# Patient Record
Sex: FEMALE | Race: OTHER | NOT HISPANIC OR LATINO
[De-identification: names, ages, dates, MRNs, and addresses within clinical notes are randomized per-mention and may not be internally consistent; named-entity substitution may affect disease eponyms.]

---

## 2020-10-06 ENCOUNTER — APPOINTMENT (OUTPATIENT)
Dept: SURGICAL ONCOLOGY | Facility: CLINIC | Age: 40
End: 2020-10-06
Payer: COMMERCIAL

## 2020-10-06 ENCOUNTER — RESULT REVIEW (OUTPATIENT)
Age: 40
End: 2020-10-06

## 2020-10-06 PROBLEM — Z00.00 ENCOUNTER FOR PREVENTIVE HEALTH EXAMINATION: Status: ACTIVE | Noted: 2020-10-06

## 2020-10-06 PROCEDURE — 99205 OFFICE O/P NEW HI 60 MIN: CPT | Mod: 25

## 2020-10-06 PROCEDURE — 19083 BX BREAST 1ST LESION US IMAG: CPT | Mod: RT

## 2020-10-06 PROCEDURE — 19084Z: CUSTOM | Mod: RT

## 2021-11-26 ENCOUNTER — TRANSCRIPTION ENCOUNTER (OUTPATIENT)
Age: 41
End: 2021-11-26

## 2023-05-31 ENCOUNTER — APPOINTMENT (OUTPATIENT)
Dept: PHYSICAL MEDICINE AND REHAB | Facility: CLINIC | Age: 43
End: 2023-05-31

## 2023-05-31 ENCOUNTER — APPOINTMENT (OUTPATIENT)
Dept: ORTHOPEDIC SURGERY | Facility: CLINIC | Age: 43
End: 2023-05-31

## 2023-06-01 ENCOUNTER — APPOINTMENT (OUTPATIENT)
Dept: ORTHOPEDIC SURGERY | Facility: CLINIC | Age: 43
End: 2023-06-01
Payer: COMMERCIAL

## 2023-06-01 ENCOUNTER — NON-APPOINTMENT (OUTPATIENT)
Age: 43
End: 2023-06-01

## 2023-06-01 VITALS — BODY MASS INDEX: 22.15 KG/M2 | HEIGHT: 63 IN | WEIGHT: 125 LBS

## 2023-06-01 PROCEDURE — 72110 X-RAY EXAM L-2 SPINE 4/>VWS: CPT

## 2023-06-01 PROCEDURE — 99204 OFFICE O/P NEW MOD 45 MIN: CPT

## 2023-06-01 RX ORDER — GABAPENTIN 300 MG/1
300 CAPSULE ORAL 3 TIMES DAILY
Qty: 90 | Refills: 0 | Status: ACTIVE | COMMUNITY
Start: 2023-06-01 | End: 1900-01-01

## 2023-06-01 RX ORDER — DICLOFENAC SODIUM 75 MG/1
75 TABLET, DELAYED RELEASE ORAL
Qty: 60 | Refills: 1 | Status: ACTIVE | COMMUNITY
Start: 2023-06-01 | End: 1900-01-01

## 2023-06-01 RX ORDER — METHYLPREDNISOLONE 4 MG/1
4 TABLET ORAL
Qty: 1 | Refills: 0 | Status: ACTIVE | COMMUNITY
Start: 2023-06-01 | End: 1900-01-01

## 2023-06-01 NOTE — ASSESSMENT
[FreeTextEntry1] : 43 year old female presents with severe low back pain radiating to her left lower extremity. She reports paresthesias. She is weaker in the left leg.  The symptoms started May 9, 2023 without injury.  She has a left foot drop.  She is otherwise neurologically intact. She will be sent for a lumbar MRI.  She was given a steroid dose pack, gabapentin and diclofenac.  The patient was given a referral for physical therapy. She will followup after her MRI. We discussed red flag symptoms that would require emergent evaluation. She knows to call with any questions or concerns or if her symptoms acutely worsen.

## 2023-06-01 NOTE — PHYSICAL EXAM
[de-identified] : General: No acute distress, conversant, well-nourished.\par Head: Normocephalic, atraumatic\par Neck: trachea midline, FROM\par Heart: normotensive and normal rate and rhythm\par Lungs: No labored breathing\par Skin: No abrasions, no rashes, no edema\par Psych: Alert and oriented to person, place and time\par Extremities: no peripheral edema or digital cyanosis\par Gait: Antalgic gait\par Vascular: warm and well perfused distally, palpable distal pulses\par \par MSK:\par Lumbar spine:\par No tenderness to palpation.  No step-off, no deformity.\par \par NEURO EXAM:\par Sensation \par Left L2  -  2/2            \par Left L3  -  2/2\par Left L4  -  2/2\par Left L5  -  1/2\par Left S1  -  1/2\par \par Right L2  -  2/2            \par Right L3  -  2/2\par Right L4  -  2/2\par Right L5  -  2/2\par Right S1  -  2/2\par \par Motor: \par Left L2 (hip flexion)                            5/5                \par Left L3 (knee extension)                   5/5                \par Left L4 (ankle dorsiflexion)                 3/5                \par Left L5 (long toe extensor)                3/5                \par Left S1 (ankle plantar flexion)           5/5\par \par Right L2 (hip flexion)                            5/5                \par Right L3 (knee extension)                   5/5                \par Right L4 (ankle dorsiflexion)                 5/5                \par Right L5 (long toe extensor)                5/5                \par Right S1 (ankle plantar flexion)           5/5\par \par Reflexes: Normal and symmetric\par Negative clonus.  Down-going Babinski.   [de-identified] : I ordered radiographs to evaluate the patient's symptoms.\par \par Lumbar 4 view radiographs taken in the office today show no dislocation or fracture.  Lumbar spondylosis.  No instability on dynamic series.

## 2023-06-01 NOTE — HISTORY OF PRESENT ILLNESS
[de-identified] : 43 year old female presents with severe low back pain radiating to her left lower extremity. She reports paresthesias. She is weaker in the left leg.  The symptoms started May 9, 2023 without injury. She denies recent illness, fevers, balance problems, saddle anesthesia, urinary retention or fecal incontinence.  She tried Advil without improvement.

## 2023-06-09 ENCOUNTER — APPOINTMENT (OUTPATIENT)
Dept: ORTHOPEDIC SURGERY | Facility: CLINIC | Age: 43
End: 2023-06-09
Payer: COMMERCIAL

## 2023-06-09 PROCEDURE — 99214 OFFICE O/P EST MOD 30 MIN: CPT

## 2023-06-18 NOTE — HISTORY OF PRESENT ILLNESS
[de-identified] : 43 year old female presents with followup with back pain radiating to her left lower extremity. She reports paresthesias.The symptoms started May 9, 2023 without injury. She denies recent illness, fevers, balance problems, saddle anesthesia, urinary retention or fecal incontinence.  Since her last visit the pain has been improving. She feels stronger.  She is here to review her MRI.

## 2023-06-18 NOTE — ASSESSMENT
[FreeTextEntry1] : 43 year old female presents with followup with back pain radiating to her left lower extremity. She reports paresthesias.The symptoms started May 9, 2023 without injury Since her last visit the pain has been improving. She feels stronger. She is otherwise neurologically intact. We reviewed her lumbar MRI. We discussed treatment options including physical therapy, medications, spinal injections and surgery.  She will continue PT.  She will continue diclofenac. She will followup in 4 weeks. We discussed red flag symptoms that would require emergent evaluation. She knows to call with any questions or concerns or if her symptoms acutely worsen.

## 2023-06-18 NOTE — PHYSICAL EXAM
[de-identified] : General: No acute distress, conversant, well-nourished.\par Head: Normocephalic, atraumatic\par Neck: trachea midline, FROM\par Heart: normotensive and normal rate and rhythm\par Lungs: No labored breathing\par Skin: No abrasions, no rashes, no edema\par Psych: Alert and oriented to person, place and time\par Extremities: no peripheral edema or digital cyanosis\par Gait: Antalgic gait\par Vascular: warm and well perfused distally, palpable distal pulses\par \par MSK:\par Lumbar spine:\par No tenderness to palpation.  No step-off, no deformity.\par \par NEURO EXAM:\par Sensation \par Left L2  -  2/2            \par Left L3  -  2/2\par Left L4  -  2/2\par Left L5  -  1/2\par Left S1  -  1/2\par \par Right L2  -  2/2            \par Right L3  -  2/2\par Right L4  -  2/2\par Right L5  -  2/2\par Right S1  -  2/2\par \par Motor: \par Left L2 (hip flexion)                            5/5                \par Left L3 (knee extension)                   5/5                \par Left L4 (ankle dorsiflexion)                 5/5                \par Left L5 (long toe extensor)                4/5                \par Left S1 (ankle plantar flexion)           5/5\par \par Right L2 (hip flexion)                            5/5                \par Right L3 (knee extension)                   5/5                \par Right L4 (ankle dorsiflexion)                 5/5                \par Right L5 (long toe extensor)                5/5                \par Right S1 (ankle plantar flexion)           5/5\par \par Reflexes: Normal and symmetric\par Negative clonus.  Down-going Babinski.   [de-identified] : Lumbar MRI (6/3/23) There is no spondylolisthesis or loss of vertebral body heights. No marrow signal abnormality is detected.\par \par L1-L2: There is no disc herniation, central canal stenosis, or foraminal narrowing.\par \par L2-L3: There is no disc herniation, central canal stenosis, or foraminal narrowing.\par \par L3-L4: A broad-based right paracentral disc herniation superimposed upon a disc bulge results in impingement of the traversing right L4 nerve root. Posterior element hypertrophy contributes to mild central canal stenosis. No foraminal narrowing is observed. \par \par L4-L5: A broad-based central/left paracentral disc herniation results in impingement of the traversing left L5 nerve root. No foraminal narrowing is observed. \par \par L5-S1: There is no disc herniation, central canal stenosis, or foraminal narrowing.\par \par The conus medullaris is normal in morphology, signal characteristics, and location.  No intradural abnormality is demonstrated. The visualized portions of the sacrum and sacroiliac joints are unremarkable.\par \par Lumbar 4 view radiographs taken in the office today show no dislocation or fracture.  Lumbar spondylosis.  No instability on dynamic series.

## 2023-07-20 ENCOUNTER — APPOINTMENT (OUTPATIENT)
Dept: ORTHOPEDIC SURGERY | Facility: CLINIC | Age: 43
End: 2023-07-20
Payer: COMMERCIAL

## 2023-07-20 DIAGNOSIS — M54.50 LOW BACK PAIN, UNSPECIFIED: ICD-10-CM

## 2023-07-20 DIAGNOSIS — M21.372 FOOT DROP, LEFT FOOT: ICD-10-CM

## 2023-07-20 DIAGNOSIS — M54.16 RADICULOPATHY, LUMBAR REGION: ICD-10-CM

## 2023-07-20 PROCEDURE — 99214 OFFICE O/P EST MOD 30 MIN: CPT | Mod: 95

## 2023-08-25 NOTE — ASSESSMENT
[FreeTextEntry1] : 43 year old female followup with followup with back pain radiating to her left lower extremity. She reports paresthesias.The symptoms started May 9, 2023 without injury Her symptoms are improving with much improved strength. She is otherwise neurologically intact.  She will continue PT.  She will continue diclofenac. She can consider spinal injections.  She will followup in 4-6 weeks. We discussed red flag symptoms that would require emergent evaluation. She knows to call with any questions or concerns or if her symptoms acutely worsen.

## 2023-08-25 NOTE — HISTORY OF PRESENT ILLNESS
[de-identified] : This visit was provided by the [facility].  This telehealth appointment was conducted using real-time 2-way audiovisual technology.  The patient Marie Almanza was at her home during the time of the visit. The provider, Mustapha Gleason was located at his office at [address] [place] [address] at the time of the visit.  The patient and Mustapha Gleason participated in the telehealth encounter.  Verbal consent was given on July 20, 2023 by the patient.  HPI: Telehealth via [facility] service: 30 minutes  43 year old female presents with followup with back pain radiating to her left lower extremity. She reports paresthesias.The symptoms started May 9, 2023 without injury. She denies recent illness, fevers, balance problems, saddle anesthesia, urinary retention or fecal incontinence.  She reports continued improvement in strength and decrease in pain.

## 2023-08-25 NOTE — PHYSICAL EXAM
[de-identified] : General: NAD AAOx4, well-appearing Respiratory: No visible respiratory distress Psych: Good mood and appropriate affect Skin: WWP, no rashes Gait: Normal gait, can do tandem gait MSK: Spine: no visible deformity, describes back pain Neuro: denies numbness, grossly moving all extremities [de-identified] : Lumbar MRI (6/3/23) There is no spondylolisthesis or loss of vertebral body heights. No marrow signal abnormality is detected.\par  \par  L1-L2: There is no disc herniation, central canal stenosis, or foraminal narrowing.\par  \par  L2-L3: There is no disc herniation, central canal stenosis, or foraminal narrowing.\par  \par  L3-L4: A broad-based right paracentral disc herniation superimposed upon a disc bulge results in impingement of the traversing right L4 nerve root. Posterior element hypertrophy contributes to mild central canal stenosis. No foraminal narrowing is observed. \par  \par  L4-L5: A broad-based central/left paracentral disc herniation results in impingement of the traversing left L5 nerve root. No foraminal narrowing is observed. \par  \par  L5-S1: There is no disc herniation, central canal stenosis, or foraminal narrowing.\par  \par  The conus medullaris is normal in morphology, signal characteristics, and location.  No intradural abnormality is demonstrated. The visualized portions of the sacrum and sacroiliac joints are unremarkable.\par  \par  Lumbar 4 view radiographs taken in the office today show no dislocation or fracture.  Lumbar spondylosis.  No instability on dynamic series.

## 2024-07-03 ENCOUNTER — NON-APPOINTMENT (OUTPATIENT)
Age: 44
End: 2024-07-03

## 2024-07-03 ENCOUNTER — APPOINTMENT (OUTPATIENT)
Dept: INTERNAL MEDICINE | Facility: CLINIC | Age: 44
End: 2024-07-03
Payer: COMMERCIAL

## 2024-07-03 VITALS
HEIGHT: 63 IN | DIASTOLIC BLOOD PRESSURE: 62 MMHG | WEIGHT: 115 LBS | SYSTOLIC BLOOD PRESSURE: 106 MMHG | BODY MASS INDEX: 20.38 KG/M2 | HEART RATE: 73 BPM | OXYGEN SATURATION: 99 % | TEMPERATURE: 98 F

## 2024-07-03 DIAGNOSIS — R00.2 PALPITATIONS: ICD-10-CM

## 2024-07-03 DIAGNOSIS — Z00.00 ENCOUNTER FOR GENERAL ADULT MEDICAL EXAMINATION W/OUT ABNORMAL FINDINGS: ICD-10-CM

## 2024-07-03 PROCEDURE — 99386 PREV VISIT NEW AGE 40-64: CPT

## 2024-07-03 PROCEDURE — 36415 COLL VENOUS BLD VENIPUNCTURE: CPT

## 2024-07-10 ENCOUNTER — NON-APPOINTMENT (OUTPATIENT)
Age: 44
End: 2024-07-10

## 2024-07-10 LAB
25(OH)D3 SERPL-MCNC: 55.1 NG/ML
ALBUMIN SERPL ELPH-MCNC: 4.9 G/DL
ALP BLD-CCNC: 63 U/L
ALT SERPL-CCNC: 19 U/L
ANION GAP SERPL CALC-SCNC: 15 MMOL/L
APPEARANCE: CLEAR
AST SERPL-CCNC: 21 U/L
BASOPHILS # BLD AUTO: 0.01 K/UL
BASOPHILS NFR BLD AUTO: 0.2 %
BILIRUB SERPL-MCNC: 0.6 MG/DL
BILIRUBIN URINE: NEGATIVE
BLOOD URINE: NEGATIVE
BUN SERPL-MCNC: 16 MG/DL
CALCIUM SERPL-MCNC: 9.7 MG/DL
CHLORIDE SERPL-SCNC: 99 MMOL/L
CHOLEST SERPL-MCNC: 382 MG/DL
CO2 SERPL-SCNC: 23 MMOL/L
COLOR: YELLOW
CREAT SERPL-MCNC: 0.67 MG/DL
EGFR: 110 ML/MIN/1.73M2
EOSINOPHIL # BLD AUTO: 0.05 K/UL
EOSINOPHIL NFR BLD AUTO: 1.2 %
ESTIMATED AVERAGE GLUCOSE: 100 MG/DL
FOLATE SERPL-MCNC: 18.5 NG/ML
GLUCOSE QUALITATIVE U: NEGATIVE MG/DL
GLUCOSE SERPL-MCNC: 97 MG/DL
HBA1C MFR BLD HPLC: 5.1 %
HCT VFR BLD CALC: 43.1 %
HDLC SERPL-MCNC: 112 MG/DL
HGB BLD-MCNC: 13.9 G/DL
IMM GRANULOCYTES NFR BLD AUTO: 0 %
KETONES URINE: NEGATIVE MG/DL
LDLC SERPL CALC-MCNC: 261 MG/DL
LEUKOCYTE ESTERASE URINE: NEGATIVE
LYMPHOCYTES # BLD AUTO: 1.81 K/UL
LYMPHOCYTES NFR BLD AUTO: 42.8 %
MAGNESIUM SERPL-MCNC: 1.9 MG/DL
MAN DIFF?: NORMAL
MCHC RBC-ENTMCNC: 31.1 PG
MCHC RBC-ENTMCNC: 32.3 GM/DL
MCV RBC AUTO: 96.4 FL
MONOCYTES # BLD AUTO: 0.27 K/UL
MONOCYTES NFR BLD AUTO: 6.4 %
NEUTROPHILS # BLD AUTO: 2.09 K/UL
NEUTROPHILS NFR BLD AUTO: 49.4 %
NITRITE URINE: NEGATIVE
NONHDLC SERPL-MCNC: 270 MG/DL
PH URINE: 6
PHOSPHATE SERPL-MCNC: 3.4 MG/DL
PLATELET # BLD AUTO: 206 K/UL
POTASSIUM SERPL-SCNC: 5 MMOL/L
PROT SERPL-MCNC: 7.1 G/DL
PROTEIN URINE: NEGATIVE MG/DL
RBC # BLD: 4.47 M/UL
RBC # FLD: 12.6 %
SODIUM SERPL-SCNC: 137 MMOL/L
SPECIFIC GRAVITY URINE: 1.01
TRIGL SERPL-MCNC: 73 MG/DL
TSH SERPL-ACNC: 1.14 UIU/ML
UROBILINOGEN URINE: 0.2 MG/DL
VIT B12 SERPL-MCNC: 1530 PG/ML
WBC # FLD AUTO: 4.23 K/UL